# Patient Record
Sex: MALE | Race: WHITE | NOT HISPANIC OR LATINO | URBAN - METROPOLITAN AREA
[De-identification: names, ages, dates, MRNs, and addresses within clinical notes are randomized per-mention and may not be internally consistent; named-entity substitution may affect disease eponyms.]

---

## 2023-08-23 ENCOUNTER — HOSPITAL ENCOUNTER (OUTPATIENT)
Dept: DATA CONVERSION | Facility: HOSPITAL | Age: 48
Discharge: HOME | End: 2023-08-23

## 2023-08-23 DIAGNOSIS — R20.0 ANESTHESIA OF SKIN: ICD-10-CM

## 2024-10-12 ENCOUNTER — HOSPITAL ENCOUNTER (EMERGENCY)
Facility: HOSPITAL | Age: 49
Discharge: HOME | End: 2024-10-12
Payer: COMMERCIAL

## 2024-10-12 VITALS
RESPIRATION RATE: 18 BRPM | WEIGHT: 280 LBS | HEART RATE: 80 BPM | OXYGEN SATURATION: 98 % | DIASTOLIC BLOOD PRESSURE: 95 MMHG | SYSTOLIC BLOOD PRESSURE: 154 MMHG | TEMPERATURE: 96.4 F | BODY MASS INDEX: 37.93 KG/M2 | HEIGHT: 72 IN

## 2024-10-12 DIAGNOSIS — S01.01XA SCALP LACERATION, INITIAL ENCOUNTER: Primary | ICD-10-CM

## 2024-10-12 PROCEDURE — 99282 EMERGENCY DEPT VISIT SF MDM: CPT

## 2024-10-12 ASSESSMENT — COLUMBIA-SUICIDE SEVERITY RATING SCALE - C-SSRS
1. IN THE PAST MONTH, HAVE YOU WISHED YOU WERE DEAD OR WISHED YOU COULD GO TO SLEEP AND NOT WAKE UP?: NO
2. HAVE YOU ACTUALLY HAD ANY THOUGHTS OF KILLING YOURSELF?: NO
6. HAVE YOU EVER DONE ANYTHING, STARTED TO DO ANYTHING, OR PREPARED TO DO ANYTHING TO END YOUR LIFE?: NO

## 2024-10-12 ASSESSMENT — PAIN - FUNCTIONAL ASSESSMENT: PAIN_FUNCTIONAL_ASSESSMENT: 0-10

## 2024-10-12 ASSESSMENT — PAIN SCALES - GENERAL: PAINLEVEL_OUTOF10: 0 - NO PAIN

## 2024-10-12 NOTE — ED PROVIDER NOTES
HPI   Chief Complaint   Patient presents with    Laceration     Laceration to head, pt hit head on plastic tubing. Denies any loc. No blood thinners.       49-year-old male presented emergency department the chief complaint of laceration on the top of his head.  He was cut on a plastic pipe at [facility].  This is about 4 hours ago.  It does not stop bleeding since then.  He is unsure of his tetanus status.  He does not want 1.  There was not a significant for his head injury.  He has no loss of consciousness.  Not anticoagulated.  No other complaint.              Patient History   No past medical history on file.  No past surgical history on file.  No family history on file.  Social History     Tobacco Use    Smoking status: Not on file    Smokeless tobacco: Not on file   Substance Use Topics    Alcohol use: Not on file    Drug use: Not on file       Physical Exam   ED Triage Vitals [10/12/24 1737]   Temperature Heart Rate Respirations BP   35.8 °C (96.4 °F) 80 18 (!) 154/95      Pulse Ox Temp src Heart Rate Source Patient Position   98 % -- -- Sitting      BP Location FiO2 (%)     Left arm --       Physical Exam  Vitals and nursing note reviewed.   Constitutional:       Appearance: Normal appearance.   HENT:      Nose: Nose normal.      Mouth/Throat:      Mouth: Mucous membranes are moist.   Cardiovascular:      Rate and Rhythm: Normal rate.      Pulses: Normal pulses.   Pulmonary:      Effort: Pulmonary effort is normal.   Abdominal:      General: Abdomen is flat.   Musculoskeletal:         General: Normal range of motion.      Cervical back: Normal range of motion.   Skin:     Comments: Abrasion to the superior scalp   Neurological:      General: No focal deficit present.      Mental Status: He is alert and oriented to person, place, and time.   Psychiatric:         Mood and Affect: Mood normal.         Behavior: Behavior normal.           ED Course & MDM   Diagnoses as of 10/12/24 4227   Scalp laceration,  initial encounter                 No data recorded     Kelvin Coma Scale Score: 15 (10/12/24 1737 : Nona Velázquez RN)                           Medical Decision Making  I have seen and evaluated this patient.  Physician available for consultation.  Vital signs have been reviewed.  All laboratory and diagnostic imaging is reviewed by myself and interpreted by myself unless otherwise stated.  Additionally imaging is interpreted by radiologist.    With abrasion.  Local wound care provided by myself including compression dressing.  Hemostasis achieved.  Tetanus shot declined.  Released in stable condition from emergent standpoint.    Labs Reviewed - No data to display  No orders to display  Medications - No data to display  There are no discharge medications for this patient.              Procedure  Wound Care    Performed by: Dylon Hooper PA-C  Authorized by: Dylon Hooper PA-C    Consent:     Consent obtained:  Verbal    Risks discussed:  Bleeding  Universal protocol:     Procedure explained and questions answered to patient or proxy's satisfaction: yes      Patient identity confirmed:  Verbally with patient  Sedation:     Sedation type:  None  Anesthesia:     Anesthesia method:  None  Procedure details:     Indications: open wounds      Wound location:  Scalp    Scalp location:  Mid-scalp    Wound age (days):  <1    Wound surface area (sq cm):  5  Dressing:     Dressing applied:  Gelfoam small and 4x4    Wrapped with:  Elastic bandage 6 inch  Post-procedure details:     Procedure completion:  Tolerated       Dylon Hooper PA-C  10/12/24 1800     8 (severe pain)